# Patient Record
Sex: MALE | Race: WHITE | NOT HISPANIC OR LATINO
[De-identification: names, ages, dates, MRNs, and addresses within clinical notes are randomized per-mention and may not be internally consistent; named-entity substitution may affect disease eponyms.]

---

## 2017-01-23 ENCOUNTER — APPOINTMENT (OUTPATIENT)
Dept: ELECTROPHYSIOLOGY | Facility: CLINIC | Age: 62
End: 2017-01-23

## 2017-01-23 DIAGNOSIS — R55 SYNCOPE AND COLLAPSE: ICD-10-CM

## 2017-02-27 ENCOUNTER — APPOINTMENT (OUTPATIENT)
Dept: ELECTROPHYSIOLOGY | Facility: CLINIC | Age: 62
End: 2017-02-27

## 2017-05-08 ENCOUNTER — APPOINTMENT (OUTPATIENT)
Dept: ELECTROPHYSIOLOGY | Facility: CLINIC | Age: 62
End: 2017-05-08

## 2019-05-31 ENCOUNTER — OUTPATIENT (OUTPATIENT)
Dept: OUTPATIENT SERVICES | Facility: HOSPITAL | Age: 64
LOS: 1 days | End: 2019-05-31
Payer: MEDICARE

## 2019-05-31 VITALS
HEART RATE: 61 BPM | OXYGEN SATURATION: 97 % | WEIGHT: 214.95 LBS | DIASTOLIC BLOOD PRESSURE: 75 MMHG | HEIGHT: 73 IN | RESPIRATION RATE: 16 BRPM | SYSTOLIC BLOOD PRESSURE: 163 MMHG | TEMPERATURE: 98 F

## 2019-05-31 DIAGNOSIS — R55 SYNCOPE AND COLLAPSE: ICD-10-CM

## 2019-05-31 PROCEDURE — 93005 ELECTROCARDIOGRAM TRACING: CPT

## 2019-05-31 PROCEDURE — 33286 RMVL SUBQ CAR RHYTHM MNTR: CPT

## 2019-05-31 PROCEDURE — 93010 ELECTROCARDIOGRAM REPORT: CPT

## 2019-05-31 NOTE — H&P CARDIOLOGY - HISTORY OF PRESENT ILLNESS
62 y/o male with PMHx of HTN, HLD, GERD, Gout, Head injury in 2011, ETOH abuse who experience 2 syncopal episodes in 2015.  He had holter monitoring at that time showing episode of ventricular arrythmia.  He is s/p ILR implant which has been unrevealing to date.  Patient presents today for ILR explant.

## 2019-05-31 NOTE — H&P CARDIOLOGY - PMH
Arrhythmia, ventricular    Arthritis  Bilateral hips, rt. knee  Carotid stenosis  50% block rt. carotid on carotid duplex 2014  Dizziness    ETOH abuse    GERD (gastroesophageal reflux disease)    Gout    Head trauma  2011, wrench dropped from 60ft height hit himmin head  Hemorrhoids, complicated    Lightheadedness    LOC (loss of consciousness)  s/p haed trauma  Rectal fissure    Tinnitus    Vertigo

## 2024-12-11 NOTE — H&P CARDIOLOGY - NS MD HP HEP C STATUS
Quality 226: Preventive Care And Screening: Tobacco Use: Screening And Cessation Intervention: Patient screened for tobacco use and is an ex/non-smoker Negative Quality 130: Documentation Of Current Medications In The Medical Record: Current Medications Documented Detail Level: Detailed Quality 431: Preventive Care And Screening: Unhealthy Alcohol Use - Screening: Patient screened for unhealthy alcohol use using a single question and scores less than 2 times per year Quality 110: Preventive Care And Screening: Influenza Immunization: Influenza Immunization Administered during Influenza season